# Patient Record
Sex: FEMALE | Race: WHITE | NOT HISPANIC OR LATINO | URBAN - METROPOLITAN AREA
[De-identification: names, ages, dates, MRNs, and addresses within clinical notes are randomized per-mention and may not be internally consistent; named-entity substitution may affect disease eponyms.]

---

## 2022-02-15 ENCOUNTER — WEB ENCOUNTER (OUTPATIENT)
Dept: URBAN - METROPOLITAN AREA CLINIC 78 | Facility: CLINIC | Age: 72
End: 2022-02-15

## 2022-02-18 ENCOUNTER — OFFICE VISIT (OUTPATIENT)
Dept: URBAN - METROPOLITAN AREA CLINIC 78 | Facility: CLINIC | Age: 72
End: 2022-02-18
Payer: MEDICARE

## 2022-02-18 ENCOUNTER — LAB OUTSIDE AN ENCOUNTER (OUTPATIENT)
Dept: URBAN - METROPOLITAN AREA CLINIC 78 | Facility: CLINIC | Age: 72
End: 2022-02-18

## 2022-02-18 VITALS
DIASTOLIC BLOOD PRESSURE: 98 MMHG | HEIGHT: 66 IN | TEMPERATURE: 98.1 F | BODY MASS INDEX: 19.44 KG/M2 | WEIGHT: 121 LBS | SYSTOLIC BLOOD PRESSURE: 154 MMHG | HEART RATE: 98 BPM

## 2022-02-18 DIAGNOSIS — R11.0 NAUSEA: ICD-10-CM

## 2022-02-18 DIAGNOSIS — T39.391S: ICD-10-CM

## 2022-02-18 DIAGNOSIS — R10.13 EPIGASTRIC BURNING SENSATION: ICD-10-CM

## 2022-02-18 PROCEDURE — 99203 OFFICE O/P NEW LOW 30 MIN: CPT | Performed by: INTERNAL MEDICINE

## 2022-02-18 RX ORDER — MEMANTINE HYDROCHLORIDE 5 MG/1
1 TABLET TABLET ORAL ONCE A DAY
Status: ACTIVE | COMMUNITY

## 2022-02-18 RX ORDER — PANTOPRAZOLE SODIUM 40 MG/1
1 TABLET TABLET, DELAYED RELEASE ORAL ONCE A DAY
Qty: 90 | Refills: 1 | OUTPATIENT
Start: 2022-02-18

## 2022-02-18 RX ORDER — LISINOPRIL 10 MG/1
1 TABLET TABLET ORAL ONCE A DAY
Status: ACTIVE | COMMUNITY

## 2022-02-18 NOTE — HPI-TODAY'S VISIT:
Beginning of 2022 - pt had a fall - she has been treated for the same She has also been diagnosed with Alzheimers SHe took a bottle and a half of advil - by mistake She now has severe epigastric burning pain, severe heartburn nausea and infrequent emesis Food worsend the symptoms No hematemesis / melena

## 2022-02-21 ENCOUNTER — OFFICE VISIT (OUTPATIENT)
Dept: URBAN - METROPOLITAN AREA MEDICAL CENTER 10 | Facility: MEDICAL CENTER | Age: 72
End: 2022-02-21
Payer: MEDICARE

## 2022-02-21 DIAGNOSIS — K29.80 ACUTE DUODENITIS: ICD-10-CM

## 2022-02-21 DIAGNOSIS — K22.2 ACQUIRED ESOPHAGEAL RING: ICD-10-CM

## 2022-02-21 DIAGNOSIS — K22.89 ESOPHAGEAL BLEEDING: ICD-10-CM

## 2022-02-21 DIAGNOSIS — K31.89 ACQUIRED DEFORMITY OF DUODENUM: ICD-10-CM

## 2022-02-21 PROCEDURE — 43239 EGD BIOPSY SINGLE/MULTIPLE: CPT | Performed by: INTERNAL MEDICINE

## 2022-03-30 ENCOUNTER — DASHBOARD ENCOUNTERS (OUTPATIENT)
Age: 72
End: 2022-03-30

## 2022-03-30 ENCOUNTER — OFFICE VISIT (OUTPATIENT)
Dept: URBAN - METROPOLITAN AREA CLINIC 78 | Facility: CLINIC | Age: 72
End: 2022-03-30
Payer: MEDICARE

## 2022-03-30 DIAGNOSIS — K29.80 DUODENITIS: ICD-10-CM

## 2022-03-30 DIAGNOSIS — K20.90 ESOPHAGITIS: ICD-10-CM

## 2022-03-30 DIAGNOSIS — Z79.1 NSAID LONG-TERM USE: ICD-10-CM

## 2022-03-30 DIAGNOSIS — K29.70 GASTRITIS: ICD-10-CM

## 2022-03-30 DIAGNOSIS — R11.0 NAUSEA: ICD-10-CM

## 2022-03-30 DIAGNOSIS — K22.2 SCHATZKI'S RING: ICD-10-CM

## 2022-03-30 PROBLEM — 66889002 SCHATZKI'S RING: Status: ACTIVE | Noted: 2022-03-30

## 2022-03-30 PROBLEM — 4556007 GASTRITIS: Status: ACTIVE | Noted: 2022-03-30

## 2022-03-30 PROBLEM — 72007001 DUODENITIS: Status: ACTIVE | Noted: 2022-03-30

## 2022-03-30 PROCEDURE — 99214 OFFICE O/P EST MOD 30 MIN: CPT | Performed by: INTERNAL MEDICINE

## 2022-03-30 RX ORDER — ONDANSETRON 4 MG/1
1 TABLET ON THE TONGUE AND ALLOW TO DISSOLVE TABLET, ORALLY DISINTEGRATING ORAL THREE TIMES A DAY
Qty: 90 | Refills: 1 | OUTPATIENT
Start: 2022-03-30

## 2022-03-30 RX ORDER — MEMANTINE HYDROCHLORIDE 5 MG/1
1 TABLET TABLET ORAL ONCE A DAY
Status: ACTIVE | COMMUNITY

## 2022-03-30 RX ORDER — PANTOPRAZOLE SODIUM 40 MG/1
1 TABLET TABLET, DELAYED RELEASE ORAL ONCE A DAY
Qty: 90 | Refills: 1 | Status: ACTIVE | COMMUNITY
Start: 2022-02-18

## 2022-03-30 RX ORDER — LISINOPRIL 10 MG/1
1 TABLET TABLET ORAL ONCE A DAY
Status: ACTIVE | COMMUNITY

## 2022-03-30 RX ORDER — PANTOPRAZOLE SODIUM 40 MG/1
1 TABLET TABLET, DELAYED RELEASE ORAL ONCE A DAY
OUTPATIENT
Start: 2022-02-18

## 2022-03-30 RX ORDER — FAMOTIDINE 40 MG/1
1 TABLET AT BEDTIME AS NEEDED TABLET, FILM COATED ORAL ONCE A DAY
Qty: 90 | Refills: 1 | OUTPATIENT
Start: 2022-03-30

## 2022-03-30 NOTE — HPI-TODAY'S VISIT:
Patient is here in a follow up after the recent upper endoscopy.  The findings of the procedure and the pathology were discussed with the patient.  Patient has no new complaints Patient is taking the meds as prescribed Patient is following the lifestyle and dietary modifications as previously discussed  She feels nauseated on rare occassions but does not have any emesis

## 2022-09-23 ENCOUNTER — ERX REFILL RESPONSE (OUTPATIENT)
Dept: URBAN - METROPOLITAN AREA CLINIC 78 | Facility: CLINIC | Age: 72
End: 2022-09-23

## 2022-09-23 RX ORDER — FAMOTIDINE 40 MG/1
1 TABLET AT BEDTIME AS NEEDED TABLET, FILM COATED ORAL ONCE A DAY
Qty: 90 | Refills: 1 | OUTPATIENT

## 2022-09-23 RX ORDER — FAMOTIDINE 40 MG/1
TAKE ONE TABLET BY MOUTH EVERY NIGHT AT BEDTIME AS NEEDED TABLET, FILM COATED ORAL
Qty: 90 TABLET | Refills: 0 | OUTPATIENT

## 2022-12-20 ENCOUNTER — ERX REFILL RESPONSE (OUTPATIENT)
Dept: URBAN - METROPOLITAN AREA CLINIC 78 | Facility: CLINIC | Age: 72
End: 2022-12-20

## 2022-12-20 RX ORDER — FAMOTIDINE 40 MG/1
TAKE ONE TABLET BY MOUTH EVERY NIGHT AT BEDTIME AS NEEDED TABLET, FILM COATED ORAL
Qty: 90 TABLET | Refills: 0 | OUTPATIENT

## 2023-04-12 ENCOUNTER — ERX REFILL RESPONSE (OUTPATIENT)
Dept: URBAN - METROPOLITAN AREA CLINIC 78 | Facility: CLINIC | Age: 73
End: 2023-04-12

## 2023-04-12 RX ORDER — FAMOTIDINE 40 MG/1
TAKE ONE TABLET BY MOUTH EVERY NIGHT AT BEDTIME AS NEEDED TABLET, FILM COATED ORAL
Qty: 90 TABLET | Refills: 0 | OUTPATIENT